# Patient Record
(demographics unavailable — no encounter records)

---

## 2025-07-23 NOTE — PHYSICAL EXAM
[FreeTextEntry1] : Exam today reveals his gait minimally antalgic on the right he does walk externally rotated right greater than left.  Examination of his hips reveals he does have mild restriction of abduction and he does have a mild external rotation contracture of approximately 15-20 degrees.  No tenderness about the hip girdle or thigh the knee has supple and full motion without instability or effusion present.  Right ankle has slight restriction of full dorsiflexion subtalar motion is normal.  He does have mild discomfort of the ankle joint line no obvious instability on stress.  Neuro vas exam of the extremity is unremarkable.  X-rays ordered and taken today of the hips as well as 3 views of the right ankle reviewed interpreted by myself reveal question of a slipped capital femoral epiphysis on both sides.  The ankle x-ray series was negative

## 2025-07-23 NOTE — CONSULT LETTER
[Dear  ___] : Dear  [unfilled], [Consult Letter:] : I had the pleasure of evaluating your patient, [unfilled]. [Please see my note below.] : Please see my note below. [Consult Closing:] : Thank you very much for allowing me to participate in the care of this patient.  If you have any questions, please do not hesitate to contact me. [Sincerely,] : Sincerely, [FreeTextEntry3] : Dr Ramon Landa JR.

## 2025-07-23 NOTE — ASSESSMENT
[FreeTextEntry1] : Impression: Rule out bilateral slipped capital femoral epiphysis.  Right ankle pain.  This patient will stay out of all athletics until further notice MRI of the hips have been ordered mother has been made aware as to the implications of a slipped capital femoral epiphysis should this proved to be the case the pain is likely to be recommended.  I will be in contact with mother pending the results of the study

## 2025-07-23 NOTE — HISTORY OF PRESENT ILLNESS
[FreeTextEntry1] : This 14-year-old healthy adolescent who was playing a lot of baseball this summer is seen with a 1 month history of pain in the right ankle region.  No obvious history of traumatic or precipitating event.  He has not noted any swelling or obvious limp though mother states he does not run as fast as he normally does.  It is to be noted however mother does note that he does walk with external rotation out-toeing on both sides right slightly more.  He has had pain in his right groin.  Past medical history is noncontributory